# Patient Record
Sex: FEMALE | Race: WHITE | NOT HISPANIC OR LATINO | Employment: UNEMPLOYED | URBAN - METROPOLITAN AREA
[De-identification: names, ages, dates, MRNs, and addresses within clinical notes are randomized per-mention and may not be internally consistent; named-entity substitution may affect disease eponyms.]

---

## 2017-08-14 DIAGNOSIS — R00.2 PALPITATIONS: ICD-10-CM

## 2017-08-15 LAB — EKG IMPRESSION: NORMAL

## 2018-11-06 ENCOUNTER — OFFICE VISIT (OUTPATIENT)
Dept: ENDOCRINOLOGY | Facility: MEDICAL CENTER | Age: 28
End: 2018-11-06
Payer: MEDICAID

## 2018-11-06 VITALS
SYSTOLIC BLOOD PRESSURE: 100 MMHG | HEART RATE: 71 BPM | WEIGHT: 170 LBS | BODY MASS INDEX: 25.76 KG/M2 | DIASTOLIC BLOOD PRESSURE: 60 MMHG | OXYGEN SATURATION: 95 % | HEIGHT: 68 IN

## 2018-11-06 DIAGNOSIS — L68.0 HIRSUTISM: Primary | ICD-10-CM

## 2018-11-06 DIAGNOSIS — R79.89 HIGH SERUM TESTOSTERONE: ICD-10-CM

## 2018-11-06 PROCEDURE — 99245 OFF/OP CONSLTJ NEW/EST HI 55: CPT | Performed by: INTERNAL MEDICINE

## 2018-11-06 RX ORDER — DROSPIRENONE, ETHINYL ESTRADIOL AND LEVOMEFOLATE CALCIUM AND LEVOMEFOLATE CALCIUM 3-0.02(24)
KIT ORAL
COMMUNITY

## 2018-11-06 RX ORDER — BIOTIN 1 MG
TABLET ORAL
COMMUNITY

## 2018-11-06 RX ORDER — METOPROLOL SUCCINATE 50 MG/1
50 TABLET, EXTENDED RELEASE ORAL DAILY
COMMUNITY

## 2018-11-07 NOTE — PROGRESS NOTES
Chief Complaint   Patient presents with   • Infertility      ?  PCO S /rule out Cushing's            CHIEF COMPLAINT:    Endocrine evaluation requested by Kelvin Coe NP, for this 28 year old lady with infertility and hirsutism.    PRESENT ILLNESS:  The patient has noted changes taking place over the past year.  Her mood has changed.  Her menstrual cycles have become shorter with lighter flow.  She has developed facial hair and very mild acne.  Her weight has not changed particularly.    Of importance is that she has already delivered three children, now ages 7, 6 and 5.  She is interested in a fourth child and had difficulty getting pregnant.  She did consult and obstetrician who did a pelvic ultrasound indicating no abnormalities.  She is against using any sort of fertility enhancing drugs like clomiphene.  Instead she has been placed on oral contraceptives and metformin.  Her facial hair growth has diminished.  Her mild acne has all but disappeared.      She was not told she had PCOS.  In fact she has the impression that she does not.  In terms of metformin, she claims that she is not diabetic.  However, she has a hemoglobin A1c of 7.6 that was done just last week.  A chemistry panel did not indicate hyperglycemia on one occasion.  In July her random glucose was 85.  Last April her glucose was 110 but I don’t know if that was fasting or not.  The total testosterone also done in July was elevated at 68 (2-45).  FSH 8.9, LH 27.1.     Also a year ago when she had an episode of abdominal pain and nausea, a CT scan showed no pelvic abnormality but the ovaries were not described.  The adrenal glands were said to be normal.     I think the probability is that this patient has a mild case of polycystic ovary disease.  Whether or not she is prediabetic or not, it is difficult to tell from one A1c level but she is on metformin which would be appropriate.  The birth control pill seems to have decreased her facial hair  growth and eliminated her acne.      In the differential diagnosis is Cushing’s disease although she does not have cushingoid features.  Also an adult adrenal hyperplasia could be considered, although I doubt that particular diagnosis because it is very uncommon compared to the prevalence of PCO S.  Prolactinoma also would be quite unusual since she never did cease having menses and no difficulty with her first three children.      My plan is to rule out Cushing’s disease with a reasonable degree of certainty.     She does not like the idea of being on oral contraceptives or metformin for that matter and she does not have a strong interest in the fourth pregnancy now after she has learned of these particular abnormalities.  I will settle the Cushing’s issue.  Later I might get a 21 hydroxylase to rule out the adult adrenal hyperplasia.  Then I might suggest a referral depending on her wishes and inclinations.        ROS:  Constitutional   feels generally healthy  Eyes   vision stable  ENT    no pain or epistaxis, no unusual congestion  Cardiac   no angina, no arrhythmia  Respiratory   no hemoptysis, no unusual dyspnea, no cough  GI    no pain, indigestion, or unexpected bowel change     no voiding symptoms  Musculoskeletal    no unusual or new pains  Skin   no suspicious or concerning lesions  Neuro  no unusual weakness or loss of function  Psych irritability once a month while using oral contraceptives  Lymph   no new or unusual lumps or nodules      Allergies: No Known Allergies    Current medicines including changes today:  Current Outpatient Prescriptions   Medication Sig Dispense Refill   • metoprolol SR (TOPROL XL) 50 MG TABLET SR 24 HR Take 50 mg by mouth every day.     • metFORMIN (GLUCOPHAGE) 500 MG Tab Take 500 mg by mouth 2 times a day, with meals.     • Biotin 1000 MCG Tab Take  by mouth.     • Cholecalciferol (VITAMIN D PO) Take  by mouth.     • Drospiren-Eth Estrad-Levomefol 3-0.02-0.451 MG Tab Take   "by mouth.       No current facility-administered medications for this visit.         History reviewed. No pertinent past medical history.    PHYSICAL EXAM:    /60 (BP Location: Left arm, Patient Position: Sitting, BP Cuff Size: Adult)   Pulse 71   Ht 1.727 m (5' 8\")   Wt 77.1 kg (170 lb)   SpO2 95%   BMI 25.85 kg/m²     Gen.   appears healthy, does not have cushingoid features    Skin   appropriate for sex and age           Minimal facial hair and basically no acne           Typical pregnancy type stretch marks on the abdomen.  No cushingoid streia    HEENT  unremarkable    Neck   thyroid gland about normal size    Heart  regular    Extremities  no edema    Neuro  gait and station normal    Psych  appropriate, calm, pleasant      ASSESSMENT AND RECOMMENDATIONS    1. High serum testosterone             Presumed PCO S, rule out Cushing's but doubt             Rule out adult adrenal hyperplasia but doubt    - BIOAVAILABLE+FREE TESTOSTERONE    2. Hirsutism           Minimal and decreasing with the use of oral contraceptives  - CORTISOL; Future  - ACTH; Future  - URINE CORTISOL 24 HR, ICMA; Future  - SALIVARY CORTISOL,MS      DISPOSITION: Follow-up initial lab by telephone.  Additional testing pending results      Kavon Smith M.D.    Copies to: BRONWYN Rice  "

## 2018-12-23 ENCOUNTER — TELEPHONE (OUTPATIENT)
Dept: ENDOCRINOLOGY | Facility: MEDICAL CENTER | Age: 28
End: 2018-12-23

## 2018-12-23 NOTE — TELEPHONE ENCOUNTER
Telephone call to patient.    Informed patient of normal cortisol,ACTH and 24 ht urine cortiosol. Salivary cortisol could not be done by lab. She can get it done in [place].  She is still taking metformin an BC pills. I asked her to follow up with her OB in [place] Re possible PCOS.     Also,alert me when salivary test is done.    Dr HAJI